# Patient Record
Sex: MALE | Race: BLACK OR AFRICAN AMERICAN | NOT HISPANIC OR LATINO | URBAN - METROPOLITAN AREA
[De-identification: names, ages, dates, MRNs, and addresses within clinical notes are randomized per-mention and may not be internally consistent; named-entity substitution may affect disease eponyms.]

---

## 2022-12-26 ENCOUNTER — INPATIENT (INPATIENT)
Facility: HOSPITAL | Age: 52
LOS: 0 days | Discharge: HOME | DRG: 916 | End: 2022-12-27
Attending: STUDENT IN AN ORGANIZED HEALTH CARE EDUCATION/TRAINING PROGRAM | Admitting: STUDENT IN AN ORGANIZED HEALTH CARE EDUCATION/TRAINING PROGRAM
Payer: COMMERCIAL

## 2022-12-26 VITALS
OXYGEN SATURATION: 100 % | SYSTOLIC BLOOD PRESSURE: 126 MMHG | DIASTOLIC BLOOD PRESSURE: 76 MMHG | RESPIRATION RATE: 16 BRPM | HEART RATE: 95 BPM

## 2022-12-26 DIAGNOSIS — S09.93XA UNSPECIFIED INJURY OF FACE, INITIAL ENCOUNTER: Chronic | ICD-10-CM

## 2022-12-26 LAB
ALBUMIN SERPL ELPH-MCNC: 4.1 G/DL — SIGNIFICANT CHANGE UP (ref 3.3–5)
ALP SERPL-CCNC: 59 U/L — SIGNIFICANT CHANGE UP (ref 40–120)
ALT FLD-CCNC: 12 U/L — SIGNIFICANT CHANGE UP (ref 10–45)
ANION GAP SERPL CALC-SCNC: 12 MMOL/L — SIGNIFICANT CHANGE UP (ref 5–17)
APTT BLD: 26.3 SEC — LOW (ref 27.5–35.5)
AST SERPL-CCNC: 26 U/L — SIGNIFICANT CHANGE UP (ref 10–40)
BASOPHILS # BLD AUTO: 0.03 K/UL — SIGNIFICANT CHANGE UP (ref 0–0.2)
BASOPHILS NFR BLD AUTO: 0.2 % — SIGNIFICANT CHANGE UP (ref 0–2)
BILIRUB SERPL-MCNC: 0.4 MG/DL — SIGNIFICANT CHANGE UP (ref 0.2–1.2)
BLD GP AB SCN SERPL QL: NEGATIVE — SIGNIFICANT CHANGE UP
BUN SERPL-MCNC: 18 MG/DL — SIGNIFICANT CHANGE UP (ref 7–23)
CALCIUM SERPL-MCNC: 9 MG/DL — SIGNIFICANT CHANGE UP (ref 8.4–10.5)
CHLORIDE SERPL-SCNC: 100 MMOL/L — SIGNIFICANT CHANGE UP (ref 96–108)
CO2 SERPL-SCNC: 26 MMOL/L — SIGNIFICANT CHANGE UP (ref 22–31)
CREAT SERPL-MCNC: 0.85 MG/DL — SIGNIFICANT CHANGE UP (ref 0.5–1.3)
CRP SERPL-MCNC: 11.5 MG/L — HIGH (ref 0–4)
EGFR: 105 ML/MIN/1.73M2 — SIGNIFICANT CHANGE UP
EOSINOPHIL # BLD AUTO: 0.13 K/UL — SIGNIFICANT CHANGE UP (ref 0–0.5)
EOSINOPHIL NFR BLD AUTO: 0.9 % — SIGNIFICANT CHANGE UP (ref 0–6)
ERYTHROCYTE [SEDIMENTATION RATE] IN BLOOD: 55 MM/HR — HIGH
GLUCOSE SERPL-MCNC: 94 MG/DL — SIGNIFICANT CHANGE UP (ref 70–99)
HCT VFR BLD CALC: 38.3 % — LOW (ref 39–50)
HGB BLD-MCNC: 13.1 G/DL — SIGNIFICANT CHANGE UP (ref 13–17)
IMM GRANULOCYTES NFR BLD AUTO: 0.4 % — SIGNIFICANT CHANGE UP (ref 0–0.9)
INR BLD: 0.98 — SIGNIFICANT CHANGE UP (ref 0.88–1.16)
LYMPHOCYTES # BLD AUTO: 16.7 % — SIGNIFICANT CHANGE UP (ref 13–44)
LYMPHOCYTES # BLD AUTO: 2.33 K/UL — SIGNIFICANT CHANGE UP (ref 1–3.3)
MCHC RBC-ENTMCNC: 32.2 PG — SIGNIFICANT CHANGE UP (ref 27–34)
MCHC RBC-ENTMCNC: 34.2 GM/DL — SIGNIFICANT CHANGE UP (ref 32–36)
MCV RBC AUTO: 94.1 FL — SIGNIFICANT CHANGE UP (ref 80–100)
MONOCYTES # BLD AUTO: 1.22 K/UL — HIGH (ref 0–0.9)
MONOCYTES NFR BLD AUTO: 8.7 % — SIGNIFICANT CHANGE UP (ref 2–14)
NEUTROPHILS # BLD AUTO: 10.21 K/UL — HIGH (ref 1.8–7.4)
NEUTROPHILS NFR BLD AUTO: 73.1 % — SIGNIFICANT CHANGE UP (ref 43–77)
NRBC # BLD: 0 /100 WBCS — SIGNIFICANT CHANGE UP (ref 0–0)
PLATELET # BLD AUTO: 292 K/UL — SIGNIFICANT CHANGE UP (ref 150–400)
POTASSIUM SERPL-MCNC: 4.1 MMOL/L — SIGNIFICANT CHANGE UP (ref 3.5–5.3)
POTASSIUM SERPL-SCNC: 4.1 MMOL/L — SIGNIFICANT CHANGE UP (ref 3.5–5.3)
PROT SERPL-MCNC: 7.3 G/DL — SIGNIFICANT CHANGE UP (ref 6–8.3)
PROTHROM AB SERPL-ACNC: 11.6 SEC — SIGNIFICANT CHANGE UP (ref 10.5–13.4)
RBC # BLD: 4.07 M/UL — LOW (ref 4.2–5.8)
RBC # FLD: 12.2 % — SIGNIFICANT CHANGE UP (ref 10.3–14.5)
RH IG SCN BLD-IMP: POSITIVE — SIGNIFICANT CHANGE UP
SARS-COV-2 RNA SPEC QL NAA+PROBE: NEGATIVE — SIGNIFICANT CHANGE UP
SODIUM SERPL-SCNC: 138 MMOL/L — SIGNIFICANT CHANGE UP (ref 135–145)
WBC # BLD: 13.97 K/UL — HIGH (ref 3.8–10.5)
WBC # FLD AUTO: 13.97 K/UL — HIGH (ref 3.8–10.5)

## 2022-12-26 PROCEDURE — 99285 EMERGENCY DEPT VISIT HI MDM: CPT

## 2022-12-26 PROCEDURE — 71045 X-RAY EXAM CHEST 1 VIEW: CPT | Mod: 26

## 2022-12-26 PROCEDURE — 99291 CRITICAL CARE FIRST HOUR: CPT | Mod: GC

## 2022-12-26 RX ORDER — TRANEXAMIC ACID 100 MG/ML
1000 INJECTION, SOLUTION INTRAVENOUS ONCE
Refills: 0 | Status: COMPLETED | OUTPATIENT
Start: 2022-12-26 | End: 2022-12-26

## 2022-12-26 RX ORDER — DIPHENHYDRAMINE HCL 50 MG
50 CAPSULE ORAL ONCE
Refills: 0 | Status: COMPLETED | OUTPATIENT
Start: 2022-12-26 | End: 2022-12-26

## 2022-12-26 RX ORDER — ENOXAPARIN SODIUM 100 MG/ML
40 INJECTION SUBCUTANEOUS EVERY 24 HOURS
Refills: 0 | Status: DISCONTINUED | OUTPATIENT
Start: 2022-12-26 | End: 2022-12-27

## 2022-12-26 RX ORDER — DEXAMETHASONE 0.5 MG/5ML
6 ELIXIR ORAL EVERY 6 HOURS
Refills: 0 | Status: DISCONTINUED | OUTPATIENT
Start: 2022-12-26 | End: 2022-12-27

## 2022-12-26 RX ORDER — SODIUM CHLORIDE 9 MG/ML
1000 INJECTION INTRAMUSCULAR; INTRAVENOUS; SUBCUTANEOUS ONCE
Refills: 0 | Status: COMPLETED | OUTPATIENT
Start: 2022-12-26 | End: 2022-12-26

## 2022-12-26 RX ORDER — DIPHENHYDRAMINE HCL 50 MG
50 CAPSULE ORAL EVERY 12 HOURS
Refills: 0 | Status: DISCONTINUED | OUTPATIENT
Start: 2022-12-27 | End: 2022-12-27

## 2022-12-26 RX ORDER — EPINEPHRINE 0.3 MG/.3ML
0.3 INJECTION INTRAMUSCULAR; SUBCUTANEOUS ONCE
Refills: 0 | Status: COMPLETED | OUTPATIENT
Start: 2022-12-26 | End: 2022-12-26

## 2022-12-26 RX ORDER — OXYMETAZOLINE HYDROCHLORIDE 0.5 MG/ML
2 SPRAY NASAL ONCE
Refills: 0 | Status: COMPLETED | OUTPATIENT
Start: 2022-12-26 | End: 2022-12-26

## 2022-12-26 RX ORDER — FAMOTIDINE 10 MG/ML
40 INJECTION INTRAVENOUS ONCE
Refills: 0 | Status: COMPLETED | OUTPATIENT
Start: 2022-12-26 | End: 2022-12-26

## 2022-12-26 RX ORDER — CHLORHEXIDINE GLUCONATE 213 G/1000ML
1 SOLUTION TOPICAL
Refills: 0 | Status: DISCONTINUED | OUTPATIENT
Start: 2022-12-26 | End: 2022-12-27

## 2022-12-26 RX ORDER — FAMOTIDINE 10 MG/ML
40 INJECTION INTRAVENOUS EVERY 12 HOURS
Refills: 0 | Status: DISCONTINUED | OUTPATIENT
Start: 2022-12-27 | End: 2022-12-27

## 2022-12-26 RX ADMIN — EPINEPHRINE 0.3 MILLIGRAM(S): 0.3 INJECTION INTRAMUSCULAR; SUBCUTANEOUS at 12:00

## 2022-12-26 RX ADMIN — TRANEXAMIC ACID 220 MILLIGRAM(S): 100 INJECTION, SOLUTION INTRAVENOUS at 14:11

## 2022-12-26 RX ADMIN — Medication 6 MILLIGRAM(S): at 17:37

## 2022-12-26 RX ADMIN — Medication 50 MILLIGRAM(S): at 12:00

## 2022-12-26 RX ADMIN — Medication 6 MILLIGRAM(S): at 23:54

## 2022-12-26 RX ADMIN — ENOXAPARIN SODIUM 40 MILLIGRAM(S): 100 INJECTION SUBCUTANEOUS at 18:02

## 2022-12-26 RX ADMIN — FAMOTIDINE 40 MILLIGRAM(S): 10 INJECTION INTRAVENOUS at 23:55

## 2022-12-26 RX ADMIN — Medication 125 MILLIGRAM(S): at 12:00

## 2022-12-26 RX ADMIN — SODIUM CHLORIDE 2000 MILLILITER(S): 9 INJECTION INTRAMUSCULAR; INTRAVENOUS; SUBCUTANEOUS at 12:00

## 2022-12-26 RX ADMIN — EPINEPHRINE 0.3 MILLIGRAM(S): 0.3 INJECTION INTRAMUSCULAR; SUBCUTANEOUS at 12:30

## 2022-12-26 RX ADMIN — EPINEPHRINE 0.3 MILLIGRAM(S): 0.3 INJECTION INTRAMUSCULAR; SUBCUTANEOUS at 12:15

## 2022-12-26 RX ADMIN — OXYMETAZOLINE HYDROCHLORIDE 2 SPRAY(S): 0.5 SPRAY NASAL at 13:10

## 2022-12-26 RX ADMIN — Medication 50 MILLIGRAM(S): at 23:55

## 2022-12-26 RX ADMIN — FAMOTIDINE 40 MILLIGRAM(S): 10 INJECTION INTRAVENOUS at 12:00

## 2022-12-26 NOTE — H&P ADULT - NSICDXPASTSURGICALHX_GEN_ALL_CORE_FT
PAST SURGICAL HISTORY:  No significant past surgical history PAST SURGICAL HISTORY:  Injury of jaw

## 2022-12-26 NOTE — H&P ADULT - NSHPLABSRESULTS_GEN_ALL_CORE
13.1   13.97 )-----------( 292      ( 26 Dec 2022 12:14 )             38.3       12-26    138  |  100  |  18  ----------------------------<  94  4.1   |  26  |  0.85    Ca    9.0      26 Dec 2022 12:14    TPro  7.3  /  Alb  4.1  /  TBili  0.4  /  DBili  x   /  AST  26  /  ALT  12  /  AlkPhos  59  12-26                      Lactate Trend            CAPILLARY BLOOD GLUCOSE

## 2022-12-26 NOTE — H&P ADULT - ASSESSMENT
52M with PMHx of HTN (Lisinopril and Amlodipine), anxiety and insomnia presented to the ED for acute onset of angioedema. ICU consulted for airway protection.     Neuro  AOx3  #Anxiety  - Holding home Prozac 60mg and Hydroxyzine 25mg PRN    #Insomnia  - Holding home Trazadone    Pulm  On RA    #Angioedema  First time episode, no FHx. no med changes. On Lisinopril  - ENT eval in the ED, w/o airway compromise, base of tongue patent and not pushed back  - ENT to re-eval q6hr and over night  - S/p Solumedrol 125mg, epi 0.3 IMx3 and Benadryl 50  - S/p TXA and FFP in the ED  - Holding Lisinopril   - ICU admission for Airway monitoring for 24hr.    CV  No active issues    Renal   No active issues    GI   NPO for now due to severe angioedema    F: None   E: Replete as necessary K>4 Mg>2  DVT Prophylaxis: Lovenox 40mg daily   GI prophylaxis: None   CODE STATUS: FULL  Dispo: MICU         52M with PMHx of HTN (Lisinopril and Amlodipine), anxiety and insomnia presented to the ED for acute onset of angioedema. ICU consulted for airway protection.     Neuro  AOx3  #Anxiety  - Holding home Prozac 60mg and Hydroxyzine 25mg PRN    #Insomnia  - Holding home Trazadone    Pulm  On RA    #Angioedema  First time episode, no FHx. no med changes. On Lisinopril  - ENT eval in the ED, w/o airway compromise, base of tongue patent and not pushed back -  Laryngoscopy revealed slightly worse soft palate/uvular edema and new vs more prominent edema at base of epiglottis. Stable, will reevaluate later   - ENT to re-eval q6hr and over night  - S/p Solumedrol 125mg, epi 0.3 IMx3 and Benadryl 50  - S/p TXA and FFP in the ED  - Holding Lisinopril   - ICU admission for Airway monitoring for 24hr.  - c/w IV Decadron 6mg q6  - f/u ESR, CRP, and complement levels     CV  #HTN - Blood pressure normotensive  - Take amlodipine and Lisinopril  - Holding in the setting of angioedema     No active issues    Renal   No active issues    GI   NPO for now due to severe angioedema    F: None   E: Replete as necessary K>4 Mg>2  DVT Prophylaxis: Lovenox 40mg daily   GI prophylaxis: None   CODE STATUS: FULL  Dispo: MICU

## 2022-12-26 NOTE — CHART NOTE - NSCHARTNOTEFT_GEN_A_CORE
Repeat flexible laryngoscopic exam. Facial swelling continues to improve. Soft palate edema improving. Epliglottic edema stable from prior exam. Arytenoid edema mildly improved. Airway grossly patent. Recommend continuing decadron, famotidine, benadryl. ENT to rescope in the AM or if patient worsens.

## 2022-12-26 NOTE — CHART NOTE - NSCHARTNOTEFT_GEN_A_CORE
Repeat Flexible Laryngoscopy Exam:    Patient reports more difficulty swallowing and some drooling. Voice noted to be improved. Facial swelling looks the same. Laryngoscopy revealed slightly worse soft palate/uvular edema and new vs more prominent edema at base of epiglottis. Arytenoid swelling stable, and may be chronic in nature. Airway still grossly patent. Patient with no signs of labored breathing or stridor but should still be closely monitored. ENT to rescope this evening or sooner if worsens.

## 2022-12-26 NOTE — ED ADULT NURSE NOTE - OBJECTIVE STATEMENT
Patient presents to the ED complaining of throat and facial swelling. Patient reports that he woke up like this. Reports throat pain. Denies any allergies. Patient noted to have significant lip swelling with muffled voice. Patient upgraded to MD montgomery.

## 2022-12-26 NOTE — ED PROVIDER NOTE - CLINICAL SUMMARY MEDICAL DECISION MAKING FREE TEXT BOX
angioedema, ?ACE inhibitor induced vs other  initiated epi and allergy cocktail, ENT/ICU consult for admission

## 2022-12-26 NOTE — H&P ADULT - NSICDXFAMILYHX_GEN_ALL_CORE_FT
FAMILY HISTORY:  No pertinent family history in first degree relatives FAMILY HISTORY:  Sibling  Still living? Unknown  Family history of Crohn's disease, Age at diagnosis: Age Unknown

## 2022-12-26 NOTE — ED PROVIDER NOTE - PHYSICAL EXAMINATION
CONSTITUTIONAL: Awake, alert and in no apparent distress.  HEENT: severe angioedema of lips, muffled voice  CARDIAC: Normal rate, regular rhythm.  Heart sounds S1, S2.   RESPIRATORY: Breath sounds clear and equal bilaterally. no tachypnea, respiratory distress.   GASTROINTESTINAL: Abdomen soft, non-tender, no guarding, distension.  MUSCULOSKELETAL: Spine appears normal, no midline spinal tenderness, range of motion is not limited, no muscle or joint tenderness. no bony tenderness.   NEUROLOGICAL: Alert, no focal deficits, no motor or sensory deficits.  SKIN: Skin normal color for race, warm, dry and intact. No evidence of rash.  PSYCHIATRIC: Normal mood and affect. no apparent risk to self or others.

## 2022-12-26 NOTE — CONSULT NOTE ADULT - SUBJECTIVE AND OBJECTIVE BOX
HPI: 52y Male with history of hypertension who presents with significant facial swelling since this morning. Patient reports that he woke up with significant swelling of his lips and mouth. He reports it is difficult for him to speak and his voice is muffled/hoarse. He denies shortness of breath or stridor. He denies drooling. He hasn't been able to eat or drink due to the mouth swelling. He reports that he is on amlodipine and lisinopril for blood pressure. He started the lisinopril a year ago. He said that in the last year he had a similar event with mouth swelling though much more mild. He denies new exposures. He denies allergies. Denies family history of similar events.     ALLERGIES  No Known Allergies    PAST MEDICAL & SURGICAL HISTORY:    MEDICATIONS:  Antiinfectives:     Hematologic/Anticoagulation:    Pain medications/Neuro:    IV fluids:    Endocrine Medications:     All other standing medications:     All other PRN medications:      SOCIAL HISTORY:  Tobacco History:  ETOH Use:   Drug Use:     FAMILY HISTORY:      REVIEW OF SYSTEMS:     LABS:  CBC-                        13.1   13.97 )-----------( 292      ( 26 Dec 2022 12:14 )             38.3         12-26    138  |  100  |  18  ----------------------------<  94  4.1   |  26  |  0.85    Ca    9.0      26 Dec 2022 12:14    TPro  7.3  /  Alb  4.1  /  TBili  0.4  /  DBili  x   /  AST  26  /  ALT  12  /  AlkPhos  59  12-26    Coagulation Studies-    Endocrine Panel-  --  --  9.0 mg/dL      Vital Signs Last 24 Hrs  T(C): --  T(F): --  HR: 100 (26 Dec 2022 13:01) (95 - 100)  BP: 121/81 (26 Dec 2022 13:01) (121/81 - 133/67)  BP(mean): --  RR: 18 (26 Dec 2022 13:01) (16 - 18)  SpO2: 97% (26 Dec 2022 13:01) (97% - 100%)    Parameters below as of 26 Dec 2022 13:01  Patient On (Oxygen Delivery Method): room air      PHYSICAL EXAM:  General: NAD, A+Ox3  Respiratory: No respiratory distress, stridor, or stertor  Voice quality: muffled/hoarse, dysarthric  Face:  Significant firm swelling and protrusion of cheeks and upper/lower lips. No overlying skin changes.   OU: EOMI  AD: Pinna wnl  AS: Pinna wnl  Nose: nasal cavity clear bilaterally, inferior turbinates normal, mucosa normal without crusting or bleeding  OC/OP: tongue normal, floor of mouth firm, moderate firm edema, no masses or lesions, limited few of OP due to lip swelling  Neck: soft/flat, no LAD  Neuro: CNII-XII intact    LARYNGOSCOPY EXAM:   Verbal consent was obtained from patient prior to procedure.  Indication: facial swelling  Anesthesia: Afrin spray was applied to the nasal cavities.    Flexible laryngoscopy was performed and revealed the following:    Nasopharynx had no mass or exudate. + uvular/soft palate edema R>L    Base of tongue was symmetric and not enlarged, not displaced posteriorly    + Vallecula with moderate edema effacing vallecula    Epiglottis without edema, + both aryepiglottic folds with mild edema and both false vocal folds with mild edema    + Arytenoids both with mild edema, no erythema     True vocal folds were fully mobile and without lesions.     + Moderate post cricoid edema    + Interarytenoid edema   The patient tolerated the procedure well.      RADIOLOGY & ADDITIONAL STUDIES:      A/P:  52y Male history of hypertension on lisinopril presenting with several hours of significant facial and upper airway swelling concerning for angioedema. Vitals normal. Nonlabored breathing. Airway patent but patient has a CRITICAL AIRWAY    - ENT to rescope in several hours or if patient worsens  - Continue angioedema cocktail  - Recommend admission to critical care setting for airway monitoring  - Hold lisinopril  - ENT to continue to follow  - Discussed with ENT chief resident and attending on call    Thank you for the consult, please page ENT at [phone] with any questions/concerns.  ----------------------------------------------------    Department of Otolaryngology - Head and Neck Surgery  [facility]

## 2022-12-26 NOTE — ED PROVIDER NOTE - OBJECTIVE STATEMENT
hx limited by acuity, 53 yo hx of htn w angioedema of lips woke up this morning, unclear trigger, no known allergies, last had pizza last night, on lisinopril for one year.

## 2022-12-26 NOTE — CHART NOTE - NSCHARTNOTEFT_GEN_A_CORE
ENT repeat flexible laryngoscope exam. Some improvement in facial swelling and voice. Still reports some difficulty swallowing but no drooling. Scope shows stable uvular edema, improving epiglottic and arytenoid edema.

## 2022-12-26 NOTE — CONSULT NOTE ADULT - SUBJECTIVE AND OBJECTIVE BOX
52M with PMHx of HTN (Lisinopril and Amlodipine), anxiety and insomnia presented to the ED for acute onset of angioedema. Patient woke up this AM with a feeling that his face are swollen, notice that both lips and b/l chicks are swollen. Patient never experienced a similar episode in the past. Denies any known allergies, new medication, new food/ eating at a new place, no bug bits or pets at home, no new creams, soap or tooth past. Denies any family history of angioedema, no exposure to extreme temp. Patient had flu like symptoms about 2 weeks ago for 3 days and was tested positive for COVID. Has no problem swallowing or difficulty breathing. Patient denies h/n/v/d, fever, chills, cp, palpitations, sob, abd pain, leg swelling, rashes, dysuria, and changes in BM.     ICU consulted for angioedema and concerns for air way protection    In the ED- VS were HR 95, /76, RR 16, 100% on RA   Labs significant for WBC 13.9.  CXR showed clear lungs  Given Epi 0.3 IMx3, Solumedrol 125mg IV, Benadryl 50mg and Pepcid 40 IV    Allergies  No Known Allergies    Home Medications: Lisinopril, Amlodipine 10, Hydroxyzine 25, Buproprion 150mg, Prozac 60mg and Trazadone 30mg PRN     SOCIAL HX: lives by himself     Smoking - Vaps occasionally (2-3 a week)       ETOH/Illicit drugs social, last evening vodka soda, no dug use  Occupation  assistant     PAST MEDICAL & SURGICAL HISTORY:  FAMILY HISTORY:  No known cardiovascular or pulmonary family history     ROS:  See HPI     PHYSICAL EXAM    ICU Vital Signs Last 24 Hrs  T(C): --  T(F): --  HR: 100 (26 Dec 2022 13:01) (95 - 100)  BP: 121/81 (26 Dec 2022 13:01) (121/81 - 133/67)  BP(mean): --  ABP: --  ABP(mean): --  RR: 18 (26 Dec 2022 13:01) (16 - 18)  SpO2: 97% (26 Dec 2022 13:01) (97% - 100%)    O2 Parameters below as of 26 Dec 2022 13:01  Patient On (Oxygen Delivery Method): room air    General: Not in distress  HEENT:  MARIANO, significant upper and lower lips swelling and b/l buccal area.              Lymphatic system: No LN  Lungs: Bilateral BS  Cardiovascular: Regular  Gastrointestinal: Soft, Positive BS  Musculoskeletal: No clubbing.  Moves all extremities.    Skin: Warm.  Intact  Neurological: No motor or sensory deficit     LABS:                          13.1   13.97 )-----------( 292      ( 26 Dec 2022 12:14 )             38.3                                               12-26    138  |  100  |  18  ----------------------------<  94  4.1   |  26  |  0.85    Ca    9.0      26 Dec 2022 12:14    TPro  7.3  /  Alb  4.1  /  TBili  0.4  /  DBili  x   /  AST  26  /  ALT  12  /  AlkPhos  59  12-26   LIVER FUNCTIONS - ( 26 Dec 2022 12:14 )  Alb: 4.1 g/dL / Pro: 7.3 g/dL / ALK PHOS: 59 U/L / ALT: 12 U/L / AST: 26 U/L / GGT: x       52M with PMHx of HTN (Lisinopril and Amlodipine), anxiety and insomnia presented to the ED for acute onset of angioedema. Patient woke up this AM with a feeling that his face are swollen, notice that both lips and b/l chicks are swollen. Patient never experienced a similar episode in the past. He reports it is difficult for him to speak and his voice is muffled/hoarse. Denies any known allergies, new medication, new food/ eating at a new place, no bug bits or pets at home, no new creams, soap or tooth past. Denies any family history of angioedema, no exposure to extreme temp. Patient had flu like symptoms about 2 weeks ago for 3 days and was tested positive for COVID. Has no problem swallowing or difficulty breathing. Patient denies h/n/v/d, fever, chills, cp, palpitations, sob, abd pain, leg swelling, rashes, dysuria, and changes in BM.     ICU consulted for angioedema and concerns for air way protection    In the ED- VS were HR 95, /76, RR 16, 100% on RA   Labs significant for WBC 13.9.  CXR showed clear lungs  Given Epi 0.3 IMx3, Solumedrol 125mg IV, Benadryl 50mg and Pepcid 40 IV    Allergies  No Known Allergies    Home Medications: Lisinopril, Amlodipine 10, Hydroxyzine 25, Buproprion 150mg, Prozac 60mg and Trazadone 30mg PRN     SOCIAL HX: lives by himself     Smoking - Vaps occasionally (2-3 a week)       ETOH/Illicit drugs social, last evening vodka soda, no dug use  Occupation  assistant     PAST MEDICAL & SURGICAL HISTORY:  FAMILY HISTORY:  No known cardiovascular or pulmonary family history     ROS:  See HPI     PHYSICAL EXAM    ICU Vital Signs Last 24 Hrs  T(C): --  T(F): --  HR: 100 (26 Dec 2022 13:01) (95 - 100)  BP: 121/81 (26 Dec 2022 13:01) (121/81 - 133/67)  BP(mean): --  ABP: --  ABP(mean): --  RR: 18 (26 Dec 2022 13:01) (16 - 18)  SpO2: 97% (26 Dec 2022 13:01) (97% - 100%)    O2 Parameters below as of 26 Dec 2022 13:01  Patient On (Oxygen Delivery Method): room air    General: Not in distress  HEENT:  MARIANO, significant upper and lower lips swelling and b/l buccal area.              Lymphatic system: No LN  Lungs: Bilateral BS  Cardiovascular: Regular  Gastrointestinal: Soft, Positive BS  Musculoskeletal: No clubbing.  Moves all extremities.    Skin: Warm.  Intact  Neurological: No motor or sensory deficit     LABS:                          13.1   13.97 )-----------( 292      ( 26 Dec 2022 12:14 )             38.3                                               12-26    138  |  100  |  18  ----------------------------<  94  4.1   |  26  |  0.85    Ca    9.0      26 Dec 2022 12:14    TPro  7.3  /  Alb  4.1  /  TBili  0.4  /  DBili  x   /  AST  26  /  ALT  12  /  AlkPhos  59  12-26   LIVER FUNCTIONS - ( 26 Dec 2022 12:14 )  Alb: 4.1 g/dL / Pro: 7.3 g/dL / ALK PHOS: 59 U/L / ALT: 12 U/L / AST: 26 U/L / GGT: x       52M with PMHx of HTN (Lisinopril and Amlodipine), anxiety and insomnia presented to the ED for acute onset of angioedema. Patient woke up this AM with a feeling that his face are swollen, notice that both lips and b/l chicks are swollen. Patient experienced a similar episode in the past but much milder then this one, at the time was treated with Prednisone. He reports it is difficult for him to speak and his voice is muffled/hoarse. Denies any known allergies, new medication, new food/ eating at a new place, no bug bits or pets at home, no new creams, soap or tooth past. Denies any family history of angioedema, no exposure to extreme temp. Patient had flu like symptoms about 2 weeks ago for 3 days and was tested positive for COVID. Last Lisinopril was taking at noon yesterday (12/25). Has no problem swallowing or difficulty breathing. Patient denies h/n/v/d, fever, chills, cp, palpitations, sob, abd pain, leg swelling, rashes, dysuria, and changes in BM.     ICU consulted for angioedema and concerns for air way protection    In the ED- VS were HR 95, /76, RR 16, 100% on RA   Labs significant for WBC 13.9.  CXR showed clear lungs  Given Epi 0.3 IMx3, Solumedrol 125mg IV, Benadryl 50mg and Pepcid 40 IV    Allergies  No Known Allergies    Home Medications: Lisinopril, Amlodipine 10, Hydroxyzine 25, Buproprion 150mg, Prozac 60mg and Trazadone 30mg PRN     SOCIAL HX: lives by himself     Smoking - Vaps occasionally (2-3 a week)       ETOH/Illicit drugs social, last evening vodka soda, no dug use  Occupation  assistant     PAST MEDICAL & SURGICAL HISTORY:  FAMILY HISTORY:  No known cardiovascular or pulmonary family history     ROS:  See HPI     PHYSICAL EXAM    ICU Vital Signs Last 24 Hrs  T(C): --  T(F): --  HR: 100 (26 Dec 2022 13:01) (95 - 100)  BP: 121/81 (26 Dec 2022 13:01) (121/81 - 133/67)  BP(mean): --  ABP: --  ABP(mean): --  RR: 18 (26 Dec 2022 13:01) (16 - 18)  SpO2: 97% (26 Dec 2022 13:01) (97% - 100%)    O2 Parameters below as of 26 Dec 2022 13:01  Patient On (Oxygen Delivery Method): room air    General: Not in distress  HEENT:  MARIANO, significant upper and lower lips swelling and b/l buccal area. no tongue swelling, no stridor              Lymphatic system: No LN  Lungs: Bilateral BS  Cardiovascular: Regular  Gastrointestinal: Soft, Positive BS  Musculoskeletal: No clubbing.  Moves all extremities.    Skin: Warm.  Intact  Neurological: No motor or sensory deficit     LABS:                          13.1   13.97 )-----------( 292      ( 26 Dec 2022 12:14 )             38.3                                               12-26    138  |  100  |  18  ----------------------------<  94  4.1   |  26  |  0.85    Ca    9.0      26 Dec 2022 12:14    TPro  7.3  /  Alb  4.1  /  TBili  0.4  /  DBili  x   /  AST  26  /  ALT  12  /  AlkPhos  59  12-26   LIVER FUNCTIONS - ( 26 Dec 2022 12:14 )  Alb: 4.1 g/dL / Pro: 7.3 g/dL / ALK PHOS: 59 U/L / ALT: 12 U/L / AST: 26 U/L / GGT: x

## 2022-12-26 NOTE — CONSULT NOTE ADULT - ATTENDING COMMENTS
Angioedema likely related to lisinopril as no other inciting drugs/new exposures identified, no evidence of urticaria/flushing/pruritis/bronchospasm also supporting bradykinin etiology. Noted to have severe lip and facial swelling, no tongue swelling on exam. Initially per patient and ED patient had muffled voice, now voice is clearer but patient noted to be drooling. Drooling likely related to inability to close mouth completely due to swollen lips, is able to swallow. Per ENT scope airway is patent. Given lack of tongue swelling can likely be intubated via glidescope or fiberoptic, but currently patient has no respiratory distress, no stridor, swallowing secretions, and appears comfortable. Received racemic epi, solumedrol, pepcid, and TXA - will continue with steroids although given likely ACEi induced the benefit of treating allergic like angioedema is unclear. Consider d/c in 24-48 hrs pending improvement. Last dose of lisinopril ~24 hrs prior, will continue to hold. Check ESR/CRP, complement. Admit to MICU.

## 2022-12-26 NOTE — H&P ADULT - NSHPPHYSICALEXAM_GEN_ALL_CORE
General: Young male, alert and oriented, NAD  HEENT: Prominent upper and lower lip swelling and b/l buccal area. no tongue swelling, no stridor              Lymphatic system: No appreciable lymphadenopathy is present   Lungs: Bilateral BS wnl, no c,r,w   Cardiovascular: RRR, normal S1 and S2, no m,r,g   Gastrointestinal: Soft, NTND, BS x4  Musculoskeletal: No clubbing.  Moves all extremities.    Skin: Warm.  Intact, no rashes, urticaria   Neurological: AandOx3, no focal deficits

## 2022-12-26 NOTE — H&P ADULT - NSHPSOCIALHISTORY_GEN_ALL_CORE
Patient lives by himself     Smoking - Vaps occasionally (2-3 a week)       ETOH/Illicit drugs social, last evening vodka soda, no dug use  Occupation  assistant

## 2022-12-26 NOTE — CONSULT NOTE ADULT - ASSESSMENT
52M with PMHx of HTN (Lisinopril and Amlodipine), anxiety and insomnia presented to the ED for acute onset of angioedema. ICU consulted for airway protection.     Neuro  AOx3  #Anxiety  - on home Prozac 60mg and Hydroxyzine 25mg PRN  - can c/w Prozac 60mg and Hydroxyzine 25mg PRN    #Insomnia  - Holding home Trazadone    Pulm  On RA    #Angioedema  First time episode, no FHx. no med changes. On Lisinopril  - ENT eval in the ED, w/o airway compromise, base of tongue patent and not pushed back  - ENT to re-eval at 6pm and over night  - S/p Solumedrol 125mg, epi 0.3 IMx3 and Benadryl 50  - Holding Lisinopril   - ICU admission for Airway monitoring for 24hr.    CV  No active issues    Renal   No active issues    GI   NPO for now due to severe angioedema    F: None   E: Replete as necessary K>4 Mg>2  DVT Prophylaxis: Lovenox 40mg daily   GI prophylaxis: None   CODE STATUS: FULL  Dispo: MICU         52M with PMHx of HTN (Lisinopril and Amlodipine), anxiety and insomnia presented to the ED for acute onset of angioedema. ICU consulted for airway protection.     Neuro  AOx3  #Anxiety  - on home Prozac 60mg and Hydroxyzine 25mg PRN  - can c/w Prozac 60mg and Hydroxyzine 25mg PRN    #Insomnia  - Holding home Trazadone    Pulm  On RA    #Angioedema  First time episode, no FHx. no med changes. On Lisinopril  - ENT eval in the ED, w/o airway compromise, base of tongue patent and not pushed back  - ENT to re-eval at 6pm and over night  - S/p Solumedrol 125mg, epi 0.3 IMx3 and Benadryl 50  - S/p TXA and FFP in the ED  - Holding Lisinopril   - ICU admission for Airway monitoring for 24hr.    CV  No active issues    Renal   No active issues    GI   NPO for now due to severe angioedema    F: None   E: Replete as necessary K>4 Mg>2  DVT Prophylaxis: Lovenox 40mg daily   GI prophylaxis: None   CODE STATUS: FULL  Dispo: MICU         52M with PMHx of HTN (Lisinopril and Amlodipine), anxiety and insomnia presented to the ED for acute onset of angioedema. ICU consulted for airway protection.     Neuro  AOx3  #Anxiety  - Holding home Prozac 60mg and Hydroxyzine 25mg PRN    #Insomnia  - Holding home Trazadone    Pulm  On RA    #Angioedema  First time episode, no FHx. no med changes. On Lisinopril  - ENT eval in the ED, w/o airway compromise, base of tongue patent and not pushed back  - ENT to re-eval q6hr and over night  - S/p Solumedrol 125mg, epi 0.3 IMx3 and Benadryl 50  - S/p TXA and FFP in the ED  - Holding Lisinopril   - ICU admission for Airway monitoring for 24hr.    CV  No active issues    Renal   No active issues    GI   NPO for now due to severe angioedema    F: None   E: Replete as necessary K>4 Mg>2  DVT Prophylaxis: Lovenox 40mg daily   GI prophylaxis: None   CODE STATUS: FULL  Dispo: MICU

## 2022-12-26 NOTE — H&P ADULT - HISTORY OF PRESENT ILLNESS
52M with PMHx of HTN (Lisinopril and Amlodipine), anxiety and insomnia presented to the ED for acute onset of angioedema. Patient woke up this AM with a feeling that his face are swollen, notice that both lips and b/l chicks are swollen. Patient experienced a similar episode in the past but much milder then this one, at the time was treated with Prednisone. He reports it is difficult for him to speak and his voice is muffled/hoarse. Denies any known allergies, new medication, new food/ eating at a new place, no bug bits or pets at home, no new creams, soap or tooth past. Denies any family history of angioedema, no exposure to extreme temp. Patient had flu like symptoms about 2 weeks ago for 3 days and was tested positive for COVID. Last Lisinopril was taking at noon yesterday (12/25). Has no problem swallowing or difficulty breathing. Patient denies h/n/v/d, fever, chills, cp, palpitations, sob, abd pain, leg swelling, rashes, dysuria, and changes in BM.     In the ED- VS were HR 95, /76, RR 16, 100% on RA   Labs significant for WBC 13.9.  Imaging -  CXR - No acute cardiopulmonary pathology   Interventions - Epi 0.3 IMx3, Solumedrol 125mg IV, Benadryl 50mg and Pepcid 40 IV  Consults - ICU, ENT

## 2022-12-27 VITALS
DIASTOLIC BLOOD PRESSURE: 79 MMHG | OXYGEN SATURATION: 98 % | HEART RATE: 94 BPM | SYSTOLIC BLOOD PRESSURE: 131 MMHG | RESPIRATION RATE: 13 BRPM

## 2022-12-27 LAB
ALBUMIN SERPL ELPH-MCNC: 4.2 G/DL — SIGNIFICANT CHANGE UP (ref 3.3–5)
ALP SERPL-CCNC: 61 U/L — SIGNIFICANT CHANGE UP (ref 40–120)
ALT FLD-CCNC: 12 U/L — SIGNIFICANT CHANGE UP (ref 10–45)
ANION GAP SERPL CALC-SCNC: 12 MMOL/L — SIGNIFICANT CHANGE UP (ref 5–17)
AST SERPL-CCNC: 19 U/L — SIGNIFICANT CHANGE UP (ref 10–40)
BASOPHILS # BLD AUTO: 0 K/UL — SIGNIFICANT CHANGE UP (ref 0–0.2)
BASOPHILS NFR BLD AUTO: 0 % — SIGNIFICANT CHANGE UP (ref 0–2)
BILIRUB SERPL-MCNC: 0.2 MG/DL — SIGNIFICANT CHANGE UP (ref 0.2–1.2)
BUN SERPL-MCNC: 14 MG/DL — SIGNIFICANT CHANGE UP (ref 7–23)
CALCIUM SERPL-MCNC: 9.5 MG/DL — SIGNIFICANT CHANGE UP (ref 8.4–10.5)
CHLORIDE SERPL-SCNC: 96 MMOL/L — SIGNIFICANT CHANGE UP (ref 96–108)
CO2 SERPL-SCNC: 29 MMOL/L — SIGNIFICANT CHANGE UP (ref 22–31)
CREAT SERPL-MCNC: 0.8 MG/DL — SIGNIFICANT CHANGE UP (ref 0.5–1.3)
EGFR: 107 ML/MIN/1.73M2 — SIGNIFICANT CHANGE UP
EOSINOPHIL # BLD AUTO: 0 K/UL — SIGNIFICANT CHANGE UP (ref 0–0.5)
EOSINOPHIL NFR BLD AUTO: 0 % — SIGNIFICANT CHANGE UP (ref 0–6)
GLUCOSE SERPL-MCNC: 152 MG/DL — HIGH (ref 70–99)
HCT VFR BLD CALC: 37.8 % — LOW (ref 39–50)
HGB BLD-MCNC: 13 G/DL — SIGNIFICANT CHANGE UP (ref 13–17)
IMM GRANULOCYTES NFR BLD AUTO: 0.6 % — SIGNIFICANT CHANGE UP (ref 0–0.9)
LYMPHOCYTES # BLD AUTO: 0.59 K/UL — LOW (ref 1–3.3)
LYMPHOCYTES # BLD AUTO: 9.2 % — LOW (ref 13–44)
MAGNESIUM SERPL-MCNC: 1.9 MG/DL — SIGNIFICANT CHANGE UP (ref 1.6–2.6)
MCHC RBC-ENTMCNC: 32 PG — SIGNIFICANT CHANGE UP (ref 27–34)
MCHC RBC-ENTMCNC: 34.4 GM/DL — SIGNIFICANT CHANGE UP (ref 32–36)
MCV RBC AUTO: 93.1 FL — SIGNIFICANT CHANGE UP (ref 80–100)
MONOCYTES # BLD AUTO: 0.21 K/UL — SIGNIFICANT CHANGE UP (ref 0–0.9)
MONOCYTES NFR BLD AUTO: 3.3 % — SIGNIFICANT CHANGE UP (ref 2–14)
NEUTROPHILS # BLD AUTO: 5.59 K/UL — SIGNIFICANT CHANGE UP (ref 1.8–7.4)
NEUTROPHILS NFR BLD AUTO: 86.9 % — HIGH (ref 43–77)
NRBC # BLD: 0 /100 WBCS — SIGNIFICANT CHANGE UP (ref 0–0)
PHOSPHATE SERPL-MCNC: 3.8 MG/DL — SIGNIFICANT CHANGE UP (ref 2.5–4.5)
PLATELET # BLD AUTO: 277 K/UL — SIGNIFICANT CHANGE UP (ref 150–400)
POTASSIUM SERPL-MCNC: 4.4 MMOL/L — SIGNIFICANT CHANGE UP (ref 3.5–5.3)
POTASSIUM SERPL-SCNC: 4.4 MMOL/L — SIGNIFICANT CHANGE UP (ref 3.5–5.3)
PROT SERPL-MCNC: 7.5 G/DL — SIGNIFICANT CHANGE UP (ref 6–8.3)
RBC # BLD: 4.06 M/UL — LOW (ref 4.2–5.8)
RBC # FLD: 12 % — SIGNIFICANT CHANGE UP (ref 10.3–14.5)
SODIUM SERPL-SCNC: 137 MMOL/L — SIGNIFICANT CHANGE UP (ref 135–145)
WBC # BLD: 6.43 K/UL — SIGNIFICANT CHANGE UP (ref 3.8–10.5)
WBC # FLD AUTO: 6.43 K/UL — SIGNIFICANT CHANGE UP (ref 3.8–10.5)

## 2022-12-27 PROCEDURE — 85652 RBC SED RATE AUTOMATED: CPT

## 2022-12-27 PROCEDURE — P9011: CPT

## 2022-12-27 PROCEDURE — 99238 HOSP IP/OBS DSCHRG MGMT 30/<: CPT | Mod: GC

## 2022-12-27 PROCEDURE — 86901 BLOOD TYPING SEROLOGIC RH(D): CPT

## 2022-12-27 PROCEDURE — 96374 THER/PROPH/DIAG INJ IV PUSH: CPT

## 2022-12-27 PROCEDURE — 99285 EMERGENCY DEPT VISIT HI MDM: CPT

## 2022-12-27 PROCEDURE — 86900 BLOOD TYPING SEROLOGIC ABO: CPT

## 2022-12-27 PROCEDURE — 86985 SPLIT BLOOD OR PRODUCTS: CPT

## 2022-12-27 PROCEDURE — 86850 RBC ANTIBODY SCREEN: CPT

## 2022-12-27 PROCEDURE — 83735 ASSAY OF MAGNESIUM: CPT

## 2022-12-27 PROCEDURE — 84100 ASSAY OF PHOSPHORUS: CPT

## 2022-12-27 PROCEDURE — 36415 COLL VENOUS BLD VENIPUNCTURE: CPT

## 2022-12-27 PROCEDURE — 86140 C-REACTIVE PROTEIN: CPT

## 2022-12-27 PROCEDURE — 71045 X-RAY EXAM CHEST 1 VIEW: CPT

## 2022-12-27 PROCEDURE — 80053 COMPREHEN METABOLIC PANEL: CPT

## 2022-12-27 PROCEDURE — 85610 PROTHROMBIN TIME: CPT

## 2022-12-27 PROCEDURE — 85025 COMPLETE CBC W/AUTO DIFF WBC: CPT

## 2022-12-27 PROCEDURE — 96372 THER/PROPH/DIAG INJ SC/IM: CPT

## 2022-12-27 PROCEDURE — 85730 THROMBOPLASTIN TIME PARTIAL: CPT

## 2022-12-27 PROCEDURE — 36430 TRANSFUSION BLD/BLD COMPNT: CPT

## 2022-12-27 PROCEDURE — 87635 SARS-COV-2 COVID-19 AMP PRB: CPT

## 2022-12-27 PROCEDURE — P9059: CPT

## 2022-12-27 PROCEDURE — 96375 TX/PRO/DX INJ NEW DRUG ADDON: CPT

## 2022-12-27 RX ORDER — HYDROXYZINE HCL 10 MG
1 TABLET ORAL
Qty: 0 | Refills: 0 | DISCHARGE

## 2022-12-27 RX ORDER — LISINOPRIL 2.5 MG/1
1 TABLET ORAL
Qty: 0 | Refills: 0 | DISCHARGE

## 2022-12-27 RX ORDER — AMLODIPINE BESYLATE 2.5 MG/1
1 TABLET ORAL
Qty: 0 | Refills: 0 | DISCHARGE

## 2022-12-27 RX ORDER — FINASTERIDE 5 MG/1
5 TABLET, FILM COATED ORAL DAILY
Refills: 0 | Status: DISCONTINUED | OUTPATIENT
Start: 2022-12-27 | End: 2022-12-27

## 2022-12-27 RX ORDER — FLUOXETINE HCL 10 MG
60 CAPSULE ORAL
Qty: 0 | Refills: 0 | DISCHARGE

## 2022-12-27 RX ORDER — BUPROPION HYDROCHLORIDE 150 MG/1
1 TABLET, EXTENDED RELEASE ORAL
Qty: 0 | Refills: 0 | DISCHARGE

## 2022-12-27 RX ORDER — TRAZODONE HCL 50 MG
30 TABLET ORAL
Qty: 0 | Refills: 0 | DISCHARGE

## 2022-12-27 RX ORDER — ZOLPIDEM TARTRATE 10 MG/1
1 TABLET ORAL
Qty: 0 | Refills: 0 | DISCHARGE

## 2022-12-27 RX ORDER — BUPROPION HYDROCHLORIDE 150 MG/1
150 TABLET, EXTENDED RELEASE ORAL DAILY
Refills: 0 | Status: DISCONTINUED | OUTPATIENT
Start: 2022-12-27 | End: 2022-12-27

## 2022-12-27 RX ORDER — FINASTERIDE 5 MG/1
1 TABLET, FILM COATED ORAL
Qty: 0 | Refills: 0 | DISCHARGE

## 2022-12-27 RX ORDER — AMLODIPINE BESYLATE 2.5 MG/1
10 TABLET ORAL EVERY 24 HOURS
Refills: 0 | Status: DISCONTINUED | OUTPATIENT
Start: 2022-12-27 | End: 2022-12-27

## 2022-12-27 RX ADMIN — Medication 6 MILLIGRAM(S): at 05:50

## 2022-12-27 RX ADMIN — CHLORHEXIDINE GLUCONATE 1 APPLICATION(S): 213 SOLUTION TOPICAL at 05:49

## 2022-12-27 RX ADMIN — Medication 6 MILLIGRAM(S): at 11:37

## 2022-12-27 RX ADMIN — AMLODIPINE BESYLATE 10 MILLIGRAM(S): 2.5 TABLET ORAL at 14:43

## 2022-12-27 RX ADMIN — Medication 50 MILLIGRAM(S): at 11:36

## 2022-12-27 RX ADMIN — FAMOTIDINE 40 MILLIGRAM(S): 10 INJECTION INTRAVENOUS at 11:36

## 2022-12-27 NOTE — DISCHARGE NOTE NURSING/CASE MANAGEMENT/SOCIAL WORK - NSDCPEFALRISK_GEN_ALL_CORE
For information on Fall & Injury Prevention, visit: [email or web address] OR  [email or web address] OR  https://www.cdc.gov/steadi/patient.html

## 2022-12-27 NOTE — PROGRESS NOTE ADULT - SUBJECTIVE AND OBJECTIVE BOX
OTOLARYNGOLOGY (ENT) PROGRESS NOTE    PATIENT: LUIS DAVIS  MRN: [id number]  : [birth year]  [id number]  DATE OF SERVICE:  22  			         HPI: 52y Male with history of hypertension who presents with significant facial swelling since this morning. Patient reports that he woke up with significant swelling of his lips and mouth. He reports it is difficult for him to speak and his voice is muffled/hoarse. He denies shortness of breath or stridor. He denies drooling. He hasn't been able to eat or drink due to the mouth swelling. He reports that he is on amlodipine and lisinopril for blood pressure. He started the lisinopril a year ago. He said that in the last year he had a similar event with mouth swelling though much more mild. He denies new exposures. He denies allergies. Denies family history of similar events.     Subjective/ Interval:    -       ALLERGIES:  No Known Allergies      MEDICATIONS:  Antiinfectives:     IV fluids:    Hematologic/Anticoagulation:  enoxaparin Injectable 40 milliGRAM(s) SubCutaneous every 24 hours    Pain medications/Neuro:    Endocrine Medications:   dexAMETHasone  Injectable 6 milliGRAM(s) IV Push every 6 hours    All other standing medications:   chlorhexidine 2% Cloths 1 Application(s) Topical <User Schedule>  diphenhydrAMINE Injectable 50 milliGRAM(s) IV Push every 12 hours  famotidine Injectable 40 milliGRAM(s) IV Push every 12 hours    All other PRN medications:    Vital Signs Last 24 Hrs  T(C): 36.8 (27 Dec 2022 06:04), Max: 37.6 (26 Dec 2022 22:40)  T(F): 98.2 (27 Dec 2022 06:04), Max: 99.6 (26 Dec 2022 22:40)  HR: 94 (27 Dec 2022 09:15) (76 - 102)  BP: 137/82 (27 Dec 2022 09:15) (109/75 - 137/82)  BP(mean): 103 (27 Dec 2022 09:15) (87 - 103)  RR: 15 (27 Dec 2022 09:15) (12 - 20)  SpO2: 100% (27 Dec 2022 09:15) (95% - 100%)    Parameters below as of 27 Dec 2022 09:15  Patient On (Oxygen Delivery Method): room air         @ 07:01  -   @ 07:00  --------------------------------------------------------  IN:    Plasma: 486 mL  Total IN: 486 mL    OUT:    Voided (mL): 1750 mL  Total OUT: 1750 mL    Total NET: -1264 mL      PHYSICAL EXAM:  General: NAD, A+Ox3  Respiratory: No respiratory distress, stridor, or stertor  Voice quality: strong phonation with interval improvement of dysarthria  Face:  Interval improvement but persistent mild upper/lower lip and cheek edema with no overlying skin changes.   OU: EOMI  AD: Pinna wnl  AS: Pinna wnl  Nose: nasal cavity clear bilaterally, inferior turbinates normal, mucosa normal without crusting or bleeding  OC/OP: tongue normal, floor of mouth with interval improvement of edema, no masses or lesions, posterior oropharynx clear  Neck: soft/flat, no LAD  Neuro: CNII-XII intact    LARYNGOSCOPY EXAM:   Verbal consent was obtained from patient prior to procedure.  Indication: facial swelling  Anesthesia: Afrin spray was applied to the nasal cavities.    Flexible laryngoscopy was performed and revealed the following:    Nasopharynx had no mass or exudate. Interval improvement of soft palate edema    Mild base of tongue edema with interval improvement, not displaced posteriorly    Interval improvement of edema within the valecula    Epiglottis without edema, interval resolution of AE fold and arytenoid edema    True vocal folds were fully mobile and without lesions.     Interval improvement of post-cricoid and intra-arytenoid edema     The patient tolerated the procedure well.      LABS                       13.0   6.43  )-----------( 277      ( 27 Dec 2022 05:30 )             37.8        137  |  96  |  14  ----------------------------<  152<H>  4.4   |  29  |  0.80    Ca    9.5      27 Dec 2022 05:30  Phos  3.8       Mg     1.9         TPro  7.5  /  Alb  4.2  /  TBili  0.2  /  DBili  x   /  AST  19  /  ALT  12  /  AlkPhos  61      Coagulation Studies-   PT/INR - ( 26 Dec 2022 15:41 )   PT: 11.6 sec;   INR: 0.98          PTT - ( 26 Dec 2022 15:41 )  PTT:26.3 sec    Endocrine Panel-  Calcium, Total Serum: 9.5 mg/dL ( @ 05:30)  Calcium, Total Serum: 9.0 mg/dL ( @ 12:14)      COVID-19 PCR: Negative (22 @ 12:14)      RADIOLOGY & ADDITIONAL STUDIES:  No new imaging studies   OTOLARYNGOLOGY (ENT) PROGRESS NOTE    PATIENT: LUIS DAVIS  MRN: [id number]  : [birth year]  [id number]  DATE OF SERVICE:  22  			         HPI: 52y Male with history of hypertension who presents with significant facial swelling since this morning. Patient reports that he woke up with significant swelling of his lips and mouth. He reports it is difficult for him to speak and his voice is muffled/hoarse. He denies shortness of breath or stridor. He denies drooling. He hasn't been able to eat or drink due to the mouth swelling. He reports that he is on amlodipine and lisinopril for blood pressure. He started the lisinopril a year ago. He said that in the last year he had a similar event with mouth swelling though much more mild. He denies new exposures. He denies allergies. Denies family history of similar events.     Subjective/ Interval:    - Patient examined at bedside on morning rounds. No acute events overnight. Afebrile, vitals stable and no desaturations on room air. Patient re-examined yesterday night with repeat scope, demonstrating interval improvement of facial, oral cavity, oropharyngeal, and laryngeal edema. This patient endorses continued improvement of edema. Denies dyspnea or noisy breathing. Voice close to baseline. Denies dysphagia or odynophagia.      ALLERGIES:  No Known Allergies      MEDICATIONS:  Antiinfectives:     IV fluids:    Hematologic/Anticoagulation:  enoxaparin Injectable 40 milliGRAM(s) SubCutaneous every 24 hours    Pain medications/Neuro:    Endocrine Medications:   dexAMETHasone  Injectable 6 milliGRAM(s) IV Push every 6 hours    All other standing medications:   chlorhexidine 2% Cloths 1 Application(s) Topical <User Schedule>  diphenhydrAMINE Injectable 50 milliGRAM(s) IV Push every 12 hours  famotidine Injectable 40 milliGRAM(s) IV Push every 12 hours    All other PRN medications:    Vital Signs Last 24 Hrs  T(C): 36.8 (27 Dec 2022 06:04), Max: 37.6 (26 Dec 2022 22:40)  T(F): 98.2 (27 Dec 2022 06:04), Max: 99.6 (26 Dec 2022 22:40)  HR: 94 (27 Dec 2022 09:15) (76 - 102)  BP: 137/82 (27 Dec 2022 09:15) (109/75 - 137/82)  BP(mean): 103 (27 Dec 2022 09:15) (87 - 103)  RR: 15 (27 Dec 2022 09:15) (12 - 20)  SpO2: 100% (27 Dec 2022 09:15) (95% - 100%)    Parameters below as of 27 Dec 2022 09:15  Patient On (Oxygen Delivery Method): room air         @ 07:01  -   @ 07:00  --------------------------------------------------------  IN:    Plasma: 486 mL  Total IN: 486 mL    OUT:    Voided (mL): 1750 mL  Total OUT: 1750 mL    Total NET: -1264 mL      PHYSICAL EXAM:  General: NAD, A+Ox3  Respiratory: No respiratory distress, stridor, or stertor  Voice quality: strong phonation with interval improvement of dysarthria  Face:  Interval improvement but persistent mild upper/lower lip and cheek edema with no overlying skin changes.   OU: EOMI  AD: Pinna wnl  AS: Pinna wnl  Nose: nasal cavity clear bilaterally, inferior turbinates normal, mucosa normal without crusting or bleeding  OC/OP: tongue normal, floor of mouth with interval improvement of edema, no masses or lesions, posterior oropharynx clear  Neck: soft/flat, no LAD  Neuro: CNII-XII intact    LARYNGOSCOPY EXAM:   Verbal consent was obtained from patient prior to procedure.  Indication: facial swelling  Anesthesia: Afrin spray was applied to the nasal cavities.    Flexible laryngoscopy was performed and revealed the following:    Nasopharynx had no mass or exudate. Interval improvement of soft palate edema    Mild base of tongue edema with interval improvement, not displaced posteriorly    Interval improvement of edema within the valecula    Epiglottis without edema, interval resolution of AE fold and arytenoid edema    True vocal folds were fully mobile and without lesions.     Interval improvement of post-cricoid and intra-arytenoid edema     The patient tolerated the procedure well.      LABS                       13.0   6.43  )-----------( 277      ( 27 Dec 2022 05:30 )             37.8        137  |  96  |  14  ----------------------------<  152<H>  4.4   |  29  |  0.80    Ca    9.5      27 Dec 2022 05:30  Phos  3.8       Mg     1.9         TPro  7.5  /  Alb  4.2  /  TBili  0.2  /  DBili  x   /  AST  19  /  ALT  12  /  AlkPhos  61      Coagulation Studies-   PT/INR - ( 26 Dec 2022 15:41 )   PT: 11.6 sec;   INR: 0.98          PTT - ( 26 Dec 2022 15:41 )  PTT:26.3 sec    Endocrine Panel-  Calcium, Total Serum: 9.5 mg/dL ( @ 05:30)  Calcium, Total Serum: 9.0 mg/dL ( @ 12:14)      COVID-19 PCR: Negative (22 @ 12:14)      RADIOLOGY & ADDITIONAL STUDIES:  No new imaging studies

## 2022-12-27 NOTE — PROGRESS NOTE ADULT - ASSESSMENT
52M with PMHx of HTN (Lisinopril and Amlodipine), anxiety and insomnia presented to the ED for acute onset of angioedema. ICU consulted for airway protection.     Neuro  AOx3  #Anxiety  - Holding home Prozac 60mg and Hydroxyzine 25mg PRN    #Insomnia  - Holding home Trazadone    Pulm  On RA    #Angioedema  First time episode, no FHx. No med changes. On Lisinopril  - ENT eval in the ED, w/o airway compromise, base of tongue patent and not pushed back -  Laryngoscopy revealed slightly worse soft palate/uvular edema and new vs more prominent edema at base of epiglottis. Stable, will reevaluate later   - ENT to re-eval q6hr and over night  - S/p Solumedrol 125mg, epi 0.3 IMx3 and Benadryl 50  - S/p TXA and FFP  - c/w IV Decadron 6mg q6  - f/u ESR, CRP, and complement levels  - ICU admission for Airway monitoring for 24hr.    CV  #HTN   Blood pressure normotensive  - Take amlodipine and Lisinopril  - Holding in the setting of angioedema     Renal   No active issues    GI   NPO for now due to severe angioedema    F: None   E: Replete as necessary K>4 Mg>2  N: NPO  DVT Prophylaxis: Lovenox 40mg daily   GI prophylaxis: None   CODE STATUS: FULL  Dispo: MICU         52M with PMHx of HTN (Lisinopril and Amlodipine), anxiety and insomnia presented to the ED for acute onset of angioedema. ICU consulted for airway protection.     Neuro  AOx3  #Anxiety  - Holding home Prozac 60mg and Hydroxyzine 25mg PRN    #Insomnia  - Holding home Trazadone    Pulm  On RA    #Angioedema  First time episode, no FHx. No med changes. On Lisinopril, started taking it in March 2022. Noted one episode of angioedema of lips in May with resolution after 3 days of prednisone. ENT eval in the ED, w/o airway compromise, base of tongue patent and not pushed back -  Laryngoscopy revealed slightly worse soft palate/uvular edema and new vs more prominent edema at base of epiglottis. Decreased edema on reevaluation overnight, per ENT. Significant improvement as of this AM.   - S/p Solumedrol 125mg, epi 0.3 IMx3 and Benadryl 50  - S/p TXA and FFP  - Discontinued IV Decadron 6mg q6h, Benadryl, and Famotidine given this is bradykinin mediated angioedema  - f/u ESR, CRP, and complement levels  - ICU admission for Airway monitoring for 24hr    CV  #HTN   Blood pressure normotensive.   - Avoid Lisinopril given angioedema  - May continue taking Amlodipine    Renal   No active issues    GI   - Passed bedside dysphagia  - Regular diet    F: None   E: Replete as necessary K>4 Mg>2  N: Regular diet  DVT Prophylaxis: Lovenox 40mg daily   GI prophylaxis: None     CODE STATUS: FULL  DISPO: MICU --> stable for discharge home

## 2022-12-27 NOTE — DISCHARGE NOTE PROVIDER - NSDCCPCAREPLAN_GEN_ALL_CORE_FT
PRINCIPAL DISCHARGE DIAGNOSIS  Diagnosis: Angioedema  Assessment and Plan of Treatment: You were admitted to the hospital for angioedema. Angioedema is a condition that causes puffiness in the tissue under the skin. People with angioedema might have swelling of the face, eyelids, ears, mouth, tongue, hands, feet, or genitals. Sometimes, people have symptoms of angioedema when they are having a dangerous allergic reaction. Call for an ambulance (in the [place] and [place], dial [phone]) if you suddenly have puffiness or hives plus any of the following: Trouble breathing, Tightness in your throat, trouble swallowing your saliva, nausea and vomiting, cramps or stomach pain, passing out. A common cause of angioedema is allergies. If you just got angioedema for the first time, it might be because you have a new allergy to something. Allergies to the following things can cause angioedema:  ?Medicines (described below)  ?Insect stings  ?Foods, such as eggs, nuts, fish, or shellfish  ?Something you have touched, such as a plant, animal saliva, or latex  The medicines that can cause angioedema include:  ?Antibiotics (usually with hives, trouble breathing, and other symptoms of an allergic reaction)  ?Medicines used to treat high blood pressure or heart disease called angiotensin-converting enzyme inhibitors (or "ACE inhibitors") – Examples include enalapril, captopril, and lisinopril. People who get angioedema because of these medicines usually don't have hives or itching.  ?Over-the-counter medicines for pain and fever, such as aspirin, ibuprofen and naproxen (sample brand name: Aleve)  Angioedema can also be caused by rare diseases that sometimes run in families. An example is hereditary angioedema. In this disease, people get repeated attacks of angioedema, belly pain, or swelling in the throat. These attacks last 2 to 5 days and then get better. However, the disease is serious because swelling in the throat can cut off the air supply. If you have angioedema and other people in your family do too, you should see a doctor. There is testing and treatment for hereditary angioed      SECONDARY DISCHARGE DIAGNOSES  Diagnosis: Hypertension  Assessment and Plan of Treatment: You have a history of high blood pressure for which you wer adolfo amlodipine and lisinopril for. Lisinopril was discontinued due to angioedema as above. Please stop taking this medication and visit your primary care doctor within 2 weeks of discharge for medication adjustment/different addition for blood pressure management.

## 2022-12-27 NOTE — DISCHARGE NOTE PROVIDER - CARE PROVIDER_API CALL
SHAYNA CLARK  Internal Medicine  [address] [facility] [place] [address]  Phone: [phone]  Fax: [phone]  Established Patient  Follow Up Time:

## 2022-12-27 NOTE — PROGRESS NOTE ADULT - SUBJECTIVE AND OBJECTIVE BOX
**Incomplete**    CC: Patient is a 52y old  Male who presents with a chief complaint of Angioedema (26 Dec 2022 15:47)      INTERVAL EVENTS: STUART    SUBJECTIVE / INTERVAL HPI: Patient seen and examined at bedside.     ROS: negative unless otherwise stated above.    VITAL SIGNS:  Vital Signs Last 24 Hrs  T(C): 37.6 (26 Dec 2022 22:40), Max: 37.6 (26 Dec 2022 22:40)  T(F): 99.6 (26 Dec 2022 22:40), Max: 99.6 (26 Dec 2022 22:40)  HR: 76 (27 Dec 2022 05:00) (76 - 100)  BP: 129/82 (27 Dec 2022 05:00) (109/75 - 133/67)  BP(mean): 99 (27 Dec 2022 05:00) (87 - 101)  RR: 15 (27 Dec 2022 05:00) (12 - 20)  SpO2: 98% (27 Dec 2022 05:00) (95% - 100%)    Parameters below as of 27 Dec 2022 05:00  Patient On (Oxygen Delivery Method): room air          12-26-22 @ 07:01  -  12-27-22 @ 06:00  --------------------------------------------------------  IN: 486 mL / OUT: 1250 mL / NET: -764 mL        PHYSICAL EXAM:  Constitutional: resting comfortably; NAD  HEENT: NC/AT, PERRL, EOMI, anicteric sclera, MMM  Neck: supple; no JVD or thyromegaly  Respiratory: CTA B/L; no W/R/R, no retractions  Cardiac: +S1/S2; RRR; no M/R/G  Gastrointestinal: soft, NT/ND; no rebound or guarding; +BSx4  Extremities: WWP, no clubbing or cyanosis; no peripheral edema  Musculoskeletal: NROM x4; no joint swelling, tenderness or erythema  Vascular: 2+ radial, DP/PT pulses B/L  Dermatologic: skin warm, dry and intact; no rashes, wounds, or scars  Neurologic: AAOx3, no focal deficits  Psychiatric: calm, cooperative, behaviors are appropriate, denies SI/HI    MEDICATIONS:  MEDICATIONS  (STANDING):  chlorhexidine 2% Cloths 1 Application(s) Topical <User Schedule>  dexAMETHasone  Injectable 6 milliGRAM(s) IV Push every 6 hours  diphenhydrAMINE Injectable 50 milliGRAM(s) IV Push every 12 hours  enoxaparin Injectable 40 milliGRAM(s) SubCutaneous every 24 hours  famotidine Injectable 40 milliGRAM(s) IV Push every 12 hours      ALLERGIES:  No Known Allergies      LABS:                        13.1   13.97 )-----------( 292      ( 26 Dec 2022 12:14 )             38.3     12-26    138  |  100  |  18  ----------------------------<  94  4.1   |  26  |  0.85    Ca    9.0      26 Dec 2022 12:14    TPro  7.3  /  Alb  4.1  /  TBili  0.4  /  DBili  x   /  AST  26  /  ALT  12  /  AlkPhos  59  12-26    PT/INR - ( 26 Dec 2022 15:41 )   PT: 11.6 sec;   INR: 0.98       PTT - ( 26 Dec 2022 15:41 )  PTT:26.3 sec      RADIOLOGY & ADDITIONAL TESTS: Reviewed.   CC: Patient is a 52y old male who presents with angioedema.    INTERVAL EVENTS: Rescope per ENT stable epiglottic edema, improving soft palette edema. Started benadryl and famotidine BID per ENT rec.     SUBJECTIVE / INTERVAL HPI: Patient seen and examined at bedside. Rested comfortably overnight. No complaints at this time. Denies fevers, chills, headache, dizziness, SOB, cough, trouble swallowing. Believes the swelling has improved and he would like to know when he can eat.     ROS: negative unless otherwise stated above.    VITAL SIGNS:  Vital Signs Last 24 Hrs  T(C): 37.6 (26 Dec 2022 22:40), Max: 37.6 (26 Dec 2022 22:40)  T(F): 99.6 (26 Dec 2022 22:40), Max: 99.6 (26 Dec 2022 22:40)  HR: 76 (27 Dec 2022 05:00) (76 - 100)  BP: 129/82 (27 Dec 2022 05:00) (109/75 - 133/67)  BP(mean): 99 (27 Dec 2022 05:00) (87 - 101)  RR: 15 (27 Dec 2022 05:00) (12 - 20)  SpO2: 98% (27 Dec 2022 05:00) (95% - 100%)    Parameters below as of 27 Dec 2022 05:00  Patient On (Oxygen Delivery Method): room air      12-26-22 @ 07:01  -  12-27-22 @ 06:00  --------------------------------------------------------  IN: 486 mL / OUT: 1250 mL / NET: -764 mL      PHYSICAL EXAM:  Constitutional: adult male resting comfortably; NAD  HEENT: NC/AT, anicteric sclera, dry MM, significant perioral edema with upper>lower lip, no tongue or soft palate edema, no stridor, no muffle voice  Neck: supple  Respiratory: CTA B/L; no W/R/R, no retractions  Cardiac: +S1/S2; RRR; no M/R/G  Gastrointestinal: soft, NT/ND; no rebound or guarding; +BS  Extremities: WWP, no clubbing or cyanosis; no peripheral edema  Vascular: 2+ radial, DP/PT pulses B/L  Dermatologic: skin warm, dry and intact; no rashes, wounds, or scars  Neurologic: AAOx3, no focal deficits  Psychiatric: calm, cooperative, behaviors are appropriate    MEDICATIONS:  MEDICATIONS  (STANDING):  chlorhexidine 2% Cloths 1 Application(s) Topical <User Schedule>  dexAMETHasone  Injectable 6 milliGRAM(s) IV Push every 6 hours  diphenhydrAMINE Injectable 50 milliGRAM(s) IV Push every 12 hours  enoxaparin Injectable 40 milliGRAM(s) SubCutaneous every 24 hours  famotidine Injectable 40 milliGRAM(s) IV Push every 12 hours      ALLERGIES:  No Known Allergies      LABS:                        13.1   13.97 )-----------( 292      ( 26 Dec 2022 12:14 )             38.3     12-26    138  |  100  |  18  ----------------------------<  94  4.1   |  26  |  0.85    Ca    9.0      26 Dec 2022 12:14    TPro  7.3  /  Alb  4.1  /  TBili  0.4  /  DBili  x   /  AST  26  /  ALT  12  /  AlkPhos  59  12-26    PT/INR - ( 26 Dec 2022 15:41 )   PT: 11.6 sec;   INR: 0.98       PTT - ( 26 Dec 2022 15:41 )  PTT:26.3 sec      RADIOLOGY & ADDITIONAL TESTS: Reviewed.

## 2022-12-27 NOTE — DISCHARGE NOTE NURSING/CASE MANAGEMENT/SOCIAL WORK - PATIENT PORTAL LINK FT
You can access the FollowMyHealth Patient Portal offered by [facility] by registering at the following website: [email or web address]. By joining [facility]’s FollowMyHealth portal, you will also be able to view your health information using other applications (apps) compatible with our system.

## 2022-12-27 NOTE — DISCHARGE NOTE PROVIDER - NSDCMRMEDTOKEN_GEN_ALL_CORE_FT
amLODIPine 10 mg oral tablet: 1 tab(s) orally once a day  buPROPion 150 mg/24 hours (XL) oral tablet, extended release: 1 tab(s) orally every 24 hours  hydrOXYzine hydrochloride 25 mg oral tablet: 1 tab(s) orally 3 times a day, As Needed  lisinopril 10 mg oral tablet: 1 tab(s) orally once a day  PROzac: 60 milligram(s) orally once a day, As Needed  traZODone: 30 milligram(s) orally once a day, As Needed   Ambien 10 mg oral tablet: 1 tab(s) orally once a day (at bedtime)  amLODIPine 10 mg oral tablet: 1 tab(s) orally once a day  buPROPion 150 mg/24 hours (XL) oral tablet, extended release: 1 tab(s) orally every 24 hours  finasteride 5 mg oral tablet: 1 tab(s) orally once a day  hydrOXYzine hydrochloride 25 mg oral tablet: 1 tab(s) orally 3 times a day, As Needed  PROzac: 60 milligram(s) orally once a day, As Needed  traZODone: 30 milligram(s) orally once a day, As Needed

## 2022-12-27 NOTE — DISCHARGE NOTE PROVIDER - HOSPITAL COURSE
52M with PMHx of HTN (Lisinopril and Amlodipine), anxiety and insomnia presented to the ED for acute onset of angioedema. Patient woke up this AM with a feeling that his face are swollen, notice that both lips and b/l chicks are swollen. Patient experienced a similar episode in the past but much milder then this one, at the time was treated with Prednisone. He reports it is difficult for him to speak and his voice is muffled/hoarse. Denies any known allergies, new medication, new food/ eating at a new place, no bug bits or pets at home, no new creams, soap or tooth past. Denies any family history of angioedema, no exposure to extreme temp. Patient had flu like symptoms about 2 weeks ago for 3 days and was tested positive for COVID. Last Lisinopril was taking at noon yesterday (12/25). Has no problem swallowing or difficulty breathing. Patient denies h/n/v/d, fever, chills, cp, palpitations, sob, abd pain, leg swelling, rashes, dysuria, and changes in BM.       52M with PMHx of HTN (Lisinopril and Amlodipine), anxiety and insomnia presented to the ED for acute onset of angioedema. ICU consulted for airway protection.     Neuro  AOx3  #Anxiety  - Holding home Prozac 60mg and Hydroxyzine 25mg PRN    #Insomnia  - Holding home Trazadone    Pulm  On RA    #Angioedema  First time episode, no FHx. No med changes. On Lisinopril  - ENT eval in the ED, w/o airway compromise, base of tongue patent and not pushed back -  Laryngoscopy revealed slightly worse soft palate/uvular edema and new vs more prominent edema at base of epiglottis. Stable, will reevaluate later   - ENT to re-eval q6hr and over night  - S/p Solumedrol 125mg, epi 0.3 IMx3 and Benadryl 50  - S/p TXA and FFP  - c/w IV Decadron 6mg q6  - f/u ESR, CRP, and complement levels  - ICU admission for Airway monitoring for 24hr.    CV  #HTN   Blood pressure normotensive  - Take amlodipine and Lisinopril  - Holding in the setting of angioedema     Renal   No active issues    GI   NPO for now due to severe angioedema    F: None   E: Replete as necessary K>4 Mg>2  N: NPO  DVT Prophylaxis: Lovenox 40mg daily   GI prophylaxis: None   CODE STATUS: FULL  Dispo: MICU   52M with PMHx of HTN (Lisinopril and Amlodipine), anxiety and insomnia presented to the ED for acute onset of lip swelling. Patient experienced a similar episode in the past but much milder then this one, at the time was treated with Prednisone. He reports it is difficult for him to speak and his voice is muffled/hoarse. He was evaluated by ENT and had bedside scope which demonstrated laryngeal edema. Improved after discontinuation of lisinopril. On day of discharge patient with improved oropharyngeal, laryngeal, and facial edema. Able to tolerate PO without difficulty.     #Angioedema  First time episode of this severity, no FHx. No med changes. DC lisinopril   - ENT eval in the ED, w/o airway compromise, base of tongue patent and not pushed back - laryngoscopy revealed slightly worse soft palate/uvular edema and new vs more prominent edema at base of epiglottis. Improved on day of discharge  - S/p Solumedrol 125mg, epi 0.3 IMx3 and Benadryl 50  - S/p TXA and FFP  - s/p IV Decadron 6mg q6  - elevated ESR, CRP, and complement levels  - ICU admission for Airway monitoring for 24hr.    #HTN   Blood pressure normotensive  - c/w amlodipine 10mg qd  - patient to avoid ACE inhibitors for the rest of his life    #Anxiety  - restart home Prozac 60mg and Hydroxyzine 25mg PRN    #Insomnia  - restart home Trazadone    New medications: none

## 2022-12-27 NOTE — PROGRESS NOTE ADULT - ATTENDING COMMENTS
HTN with angioedema most likely ACEI related  physical as above  swelling is improving  with this type of angioedema there is no clear benefit to steroids, can DC  instructed him that the angioedema may recur over next few weeks and he should return if worsening  instructed him that he should never be on ACE inhibitors again  plan for discharge home

## 2022-12-27 NOTE — PROGRESS NOTE ADULT - ASSESSMENT
A/P:  52y Male history of hypertension on lisinopril presenting with several hours of significant facial and upper airway swelling concerning for angioedema, admitted to MICU for airway observation. S/p epinephrine, decadron, and plasma. Patient with interval improvement of laryngeal edema on serial flexible fiberoptic laryngoscopies. Facial edema and oropharyngeal edema also with clinical improvement.    - Continue angioedema regimen  - Discontinue ACEi  - Examined with ENT chief resident and discussed with attending on call  - Please page ENT with any additional questions or concerns A/P:  52y Male history of hypertension on lisinopril presenting with several hours of significant facial and upper airway swelling concerning for angioedema, admitted to MICU for airway observation. S/p epinephrine, decadron, and plasma. Patient with interval improvement of laryngeal edema on serial flexible fiberoptic laryngoscopies. Facial edema and oropharyngeal edema also with clinical improvement.    - Continue angioedema regimen  - Can increase intervals between steroid doses and other angioedema medication doses  - Discontinue ACEi  - Please page ENT with any worsening of symptoms or clinical deterioration   - Examined with ENT chief resident and discussed with attending on call

## 2023-01-03 DIAGNOSIS — G47.00 INSOMNIA, UNSPECIFIED: ICD-10-CM

## 2023-01-03 DIAGNOSIS — T46.4X5A ADVERSE EFFECT OF ANGIOTENSIN-CONVERTING-ENZYME INHIBITORS, INITIAL ENCOUNTER: ICD-10-CM
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
## 2023-01-03 DIAGNOSIS — I10 ESSENTIAL (PRIMARY) HYPERTENSION: ICD-10-CM

## 2023-01-03 DIAGNOSIS — R22.0 LOCALIZED SWELLING, MASS AND LUMP, HEAD: ICD-10-CM

## 2023-01-03 DIAGNOSIS — F41.9 ANXIETY DISORDER, UNSPECIFIED: ICD-10-CM

## 2023-01-03 DIAGNOSIS — T88.6XXA ANAPHYLACTIC REACTION DUE TO ADVERSE EFFECT OF CORRECT DRUG OR MEDICAMENT PROPERLY ADMINISTERED, INITIAL ENCOUNTER: ICD-10-CM

## 2023-01-03 DIAGNOSIS — T78.3XXA ANGIONEUROTIC EDEMA, INITIAL ENCOUNTER: ICD-10-CM

## 2023-01-03 DIAGNOSIS — Z86.16 PERSONAL HISTORY OF COVID-19: ICD-10-CM

## 2023-01-03 DIAGNOSIS — F17.290 NICOTINE DEPENDENCE, OTHER TOBACCO PRODUCT, UNCOMPLICATED: ICD-10-CM
